# Patient Record
(demographics unavailable — no encounter records)

---

## 2024-11-20 NOTE — DISCUSSION/SUMMARY
[GA at Birth: ___] : GA at Birth: [unfilled] [Chronological Age: ___] : Chronological Age: [unfilled] [Corrected Age: ___] : Corrected Age: [unfilled] [Alert] : alert [Quiet] : quiet [Turns head to both sides (0-2 months)] : turns head to both sides (0-2 months) [Moves extremities equally] : moves extremities equally [Moves against gravity] : moves against gravity [Hands to midline (0-3 months)] : hands to midline (0-3 months) [Turns head side to side] : turns head side to side [Lifts head (45 deg 0-2 mon, 90 deg 1-3 mon)] : lifts head (45 degrees 0-2 months, 90 degrees 1-3 months) [Assist] : prone to supine (2- 5 months) - Assist [] : yes [FreeTextEntry1] : h/o ex 33-5 weeker, breech presentation (no hip US yet), and RDS with NICU stay [FreeTextEntry3] : Tolerated handling and changes in position well. Has not yet had hip US for breech presentation. Clinically, hips feel good. Making sounds. Attentive visually and auditorily. Parents are providing with tummy time, and attentive to any asymmetries. Full cervical and extremity ROM x 4. Muscle tone age appropriate. Opening hands.  In prone, pushing up on hands and turning head, with accidental rolling. Supine: Bringing hand to mouth. Bins: Moderate risk for chronologic age. Education on progression of toys and continuation of providing tummy time and floor time.